# Patient Record
Sex: FEMALE | Race: WHITE
[De-identification: names, ages, dates, MRNs, and addresses within clinical notes are randomized per-mention and may not be internally consistent; named-entity substitution may affect disease eponyms.]

---

## 2020-07-15 NOTE — XRAY
Indication: Bilateral L4-S1 MBB.



Intraoperative fluoroscopy was provided for 10 seconds.  Single digital spot

image submitted for interpretation demonstrates posterior needle tips

projecting over the expected course of the left and right L4-S1 nerve roots.

Correlate with intraoperative findings/report.

## 2020-10-14 NOTE — XRAY
Indication: Bilateral L4-S1 MBB.



Intraoperative fluoroscopy was provided for 14 seconds.  Single digital spot

image submitted for interpretation demonstrates posterior needle tips

projecting over the expected course of the left and right L4-S1 nerve roots.

Correlate with intraoperative findings/report.

## 2020-11-11 NOTE — XRAY
Indication: Left L4-S1 RFA.



Intraoperative fluoroscopy was provided for 36 seconds.  3 digital spot image

submitted for interpretation demonstrate posterior needle tips projecting over

the expected left L4-S1 nerve roots.  Correlate with intraoperative

findings/report.

## 2020-11-18 NOTE — XRAY
Indication: Right L4-S1 RFA



Intraoperative fluoroscopy was provided for 26 seconds.  3 digital spot images

submitted for interpretation demonstrates posterior needle tips projecting

over the expected right L3-S1 nerve roots.  Correlate with intraoperative

findings/report.

## 2023-01-26 ENCOUNTER — HOSPITAL ENCOUNTER (EMERGENCY)
Dept: HOSPITAL 33 - ED | Age: 43
Discharge: HOME | End: 2023-01-26
Payer: COMMERCIAL

## 2023-01-26 VITALS — OXYGEN SATURATION: 98 % | DIASTOLIC BLOOD PRESSURE: 89 MMHG | SYSTOLIC BLOOD PRESSURE: 117 MMHG

## 2023-01-26 VITALS — HEART RATE: 69 BPM

## 2023-01-26 DIAGNOSIS — E78.5: ICD-10-CM

## 2023-01-26 DIAGNOSIS — S93.402A: Primary | ICD-10-CM

## 2023-01-26 PROCEDURE — 73610 X-RAY EXAM OF ANKLE: CPT

## 2023-01-26 PROCEDURE — 84550 ASSAY OF BLOOD/URIC ACID: CPT

## 2023-01-26 PROCEDURE — 96372 THER/PROPH/DIAG INJ SC/IM: CPT

## 2023-01-26 PROCEDURE — 36415 COLL VENOUS BLD VENIPUNCTURE: CPT

## 2023-01-26 PROCEDURE — 99283 EMERGENCY DEPT VISIT LOW MDM: CPT

## 2023-01-26 NOTE — ERPHSYRPT
- History of Present Illness


Time Seen by Provider: 23 18:14


Source: patient


Exam Limitations: no limitations


Patient Subjective Stated Complaint: pt here for swelling and pain to left ankle

since 23 with no injury, was seen on 23. no  fracture


Triage Nursing Assessment: pt alert. walked in, resp easy, face mask in place, 

has swelling and tenderness to out aspect of left ankle


Physician History: 





42 years old female presented in the ER with chief complaint of left ankle pain 

since eighth of this month.  Patient reports she woke up with ankle swelling and

pain.  Was seen out patient with negative x-rays per her.  Hurts with 

ambulation.  Denies any fall or trauma.


Method of Injury: unknown


Occurred: days ago


Quality: sharpness


Severity of Pain-Max: moderate


Severity of Pain-Current: moderate


Lower Extremities Pain: ankle: left


Modifying Factors: Improves With: immobilization.  Worsens With: movement


Associated Symptoms: No snapping sensation, No popping sensation


Allergies/Adverse Reactions: 








amoxicillin Allergy (Verified 23 18:23)


   





Hx Tetanus, Diphtheria Vaccination/Date Given: No


Immunizations Up to Date: Yes





Travel Risk





- International Travel


Have you traveled outside of the country in past 3 weeks: No





- Coronavirus Screening


Are you exhibiting any of the following symptoms?: No





- Vaccine Status


Have you recieved a Covid-19 vaccination: Yes


: Pfizer





- Vaccination Dates


Date of 2cond Vaccination (if applicable): 





- Review of Systems


Constitutional: No Symptoms


Ears, Nose, & Throat: No Symptoms


Respiratory: No Symptoms


Cardiac: No Symptoms


Abdominal/Gastrointestinal: No Symptoms


Genitourinary Symptoms: No Symptoms


Musculoskeletal: Joint Pain, Joint Swelling


Skin: No Symptoms


Neurological: No Symptoms


Endocrine: No Symptoms


Hematologic/Lymphatic: No Symptoms





- Past Medical History


Pertinent Past Medical History: Yes


Cardiac History: High Cholesterol


Psycho-Social History: Bipolar, Depression





- Past Surgical History


Past Surgical History: Yes


Gastrointestinal: Cholecystectomy


Female Surgical History:  Section


Other Surgical History: finger





- Social History


Smoking Status: Never smoker


Exposure to second hand smoke: No


Drug Use: none


Patient Lives Alone: No





- Female History


Hx Last Menstrual Period: last week


Hx Pregnant Now: No





- Nursing Vital Signs


Nursing Vital Signs: 


                               Initial Vital Signs











Temperature  97.3 F   23 18:21


 


Pulse Rate  85   23 18:21


 


Respiratory Rate  16   23 18:21


 


Blood Pressure  143/101   23 18:21


 


O2 Sat by Pulse Oximetry  100   23 18:21








                                   Pain Scale











Pain Intensity                 8

















- Physical Exam


General Appearance: no apparent distress, alert


Eyes, Ears, Nose, Throat Exam: normal ENT inspection


Neck Exam: normal inspection, supple, full range of motion


Cardiovascular/Respiratory Exam: normal breath sounds, regular rate/rhythm


Back Exam: normal range of motion


Ankle Exam: right ankle: non-tender, normal inspection, normal range of motion, 

left ankle: bone tenderness (lateral malleolus ), pain, soft tissue tenderness, 

swelling


Foot Exam: bilateral foot: non-tender, normal inspection, normal range of 

motion, no evidence of injury


Neuro/Tendon Exam: normal sensation, normal motor functions, normal tendon 

functions


Mental Status Exam: alert, oriented x 3, cooperative


Skin Exam: normal color


**SpO2 Interpretation**: normal


SpO2: 98


O2 Delivery: Room Air


Ordered Tests: 


                               Active Orders 24 hr











 Category Date Time Status


 


 ANKLE (3 VIEWS) Stat Exams  23 18:30 Taken


 


 Uric Acid Stat Lab  23 18:55 Completed








Medication Summary














Discontinued Medications














Generic Name Dose Route Start Last Admin





  Trade Name Babatundeq  PRN Reason Stop Dose Admin


 


Ketorolac Tromethamine  30 mg  23 18:31  23 18:37





  Ketorolac Tromethamine 30 Mg/Ml Inj  IM  23 18:32  30 mg





  STAT ONE   Administration


 


Ketorolac Tromethamine  Confirm  23 18:36 





  Ketorolac Tromethamine 30 Mg/Ml Inj  Administered  23 18:37 





  Dose  





  30 mg  





  .ROUTE  





  .K-MED ONE  











Lab/Rad Data: 


                               Laboratory Results











  23 Range/Units





  18:55 


 


Uric Acid  3.2  (2.6-6.0)  mg/dL














- Progress


Progress: improved, pain not gone completely


Progress Note: 





23 19:39





42 years old female is evaluated in the ER with right ankle pain and swelling 

for almost 3 weeks without any known trauma.  She was seen outpatient with 

negative x-rays per her.  She has been taking naproxen with no significant 

relief and now pain is getting worse.  It is more in the lateral malleoli are 

area.  She is able to ambulate but pain gets worse with ambulation.  No pain in 

the foot.  Does have swelling and tenderness of lateral malleolus.  X-rays 

negative for any acute fracture dislocation reviewed by me, official report is 

pending.  Given Toradol for symptomatic relief.  I believe patient has sprain 

and recommended/given Aircast and weightbearing as tolerated with outpatient 

orthopedics follow-up.  Also obtained uric acid which is negative for gout and 

no increased temperature or redness of joint.


Counseled pt/family regarding: diagnosis, need for follow-up, rad results





- Departure


Departure Disposition: Home


Clinical Impression: 


 Left ankle sprain





Condition: Stable


Critical Care Time: No


Referrals: 


OSMEL VEGA NP [Primary Care Provider] - Follow up/PCP as directed (1-2 

days for re evaluation)


ORTHO - EMERY RAWLS NP [NON-STAFF PHY W/O PRIVILEGES] - Follow up/PCP as 

directed (1-2 days for re evaluation)


Instructions:  Ankle Sprain (DC)


Additional Instructions: 


Take Tylenol/ibuprofen as needed for pain.  Intermittent ice application.  

Follow-up with orthopedics for reevaluation in 1 to 2 days.  Avoid exertional 

activities.  Return to ER for any worsening.


Prescriptions: 


Ibuprofen 600 mg PO Q6HPRN PRN 10 Days #20 tablet


 PRN Reason: Pain

## 2023-01-27 NOTE — XRAY
Indication: Pain and swelling.  No known injury.



Comparison: None



3 view left ankle demonstrates mild lateral soft tissue swelling and tiny heel

spurs.  No other bony, articular, or soft tissue abnormalities.

## 2023-06-09 ENCOUNTER — HOSPITAL ENCOUNTER (OUTPATIENT)
Dept: HOSPITAL 33 - SDC | Age: 43
Discharge: HOME | End: 2023-06-09
Attending: STUDENT IN AN ORGANIZED HEALTH CARE EDUCATION/TRAINING PROGRAM
Payer: COMMERCIAL

## 2023-06-09 VITALS — HEART RATE: 72 BPM | DIASTOLIC BLOOD PRESSURE: 74 MMHG | SYSTOLIC BLOOD PRESSURE: 134 MMHG

## 2023-06-09 VITALS — OXYGEN SATURATION: 97 %

## 2023-06-09 DIAGNOSIS — M65.9: Primary | ICD-10-CM

## 2023-06-09 DIAGNOSIS — S93.402A: ICD-10-CM

## 2023-06-09 DIAGNOSIS — M25.572: ICD-10-CM

## 2023-06-09 LAB
ALBUMIN SERPL-MCNC: 4.1 G/DL (ref 3.5–5)
ALP SERPL-CCNC: 93 U/L (ref 38–126)
ALT SERPL-CCNC: 29 U/L (ref 0–35)
ANION GAP SERPL CALC-SCNC: 14.3 MEQ/L (ref 5–15)
APTT PPP: 29.3 SECONDS (ref 25.1–36.5)
AST SERPL QL: 29 U/L (ref 14–36)
BILIRUB BLD-MCNC: 0.6 MG/DL (ref 0.2–1.3)
BUN SERPL-MCNC: 6 MG/DL (ref 7–17)
CALCIUM SPEC-MCNC: 8.4 MG/DL (ref 8.4–10.2)
CHLORIDE SERPL-SCNC: 105 MMOL/L (ref 98–107)
CO2 SERPL-SCNC: 24 MMOL/L (ref 22–30)
CREAT SERPL-MCNC: 0.56 MG/DL (ref 0.52–1.04)
GFR SERPLBLD BASED ON 1.73 SQ M-ARVRAT: > 60 ML/MIN
GLUCOSE SERPL-MCNC: 110 MG/DL (ref 74–106)
HCG UR QL: NEGATIVE
HCT VFR BLD AUTO: 39.2 % (ref 35–47)
HGB BLD-MCNC: 13.1 G/DL (ref 12–16)
INR PPP: 0.92 (ref 0.8–3)
MCH RBC QN AUTO: 27.7 PG (ref 26–32)
MCHC RBC AUTO-ENTMCNC: 33.4 G/DL (ref 32–36)
PLATELET # BLD AUTO: 301 X10^3/UL (ref 150–450)
POTASSIUM SERPLBLD-SCNC: 3.6 MMOL/L (ref 3.5–5.1)
PROT SERPL-MCNC: 7.5 G/DL (ref 6.3–8.2)
PROTHROMBIN TIME: 10.1 SECONDS (ref 9.4–12.5)
RBC # BLD AUTO: 4.73 X10^6/UL (ref 4.1–5.4)
SODIUM SERPL-SCNC: 140 MMOL/L (ref 137–145)
WBC # BLD AUTO: 5.2 X10^3/UL (ref 4–10.5)

## 2023-06-09 PROCEDURE — 85027 COMPLETE CBC AUTOMATED: CPT

## 2023-06-09 PROCEDURE — 80053 COMPREHEN METABOLIC PANEL: CPT

## 2023-06-09 PROCEDURE — C1713 ANCHOR/SCREW BN/BN,TIS/BN: HCPCS

## 2023-06-09 PROCEDURE — 36415 COLL VENOUS BLD VENIPUNCTURE: CPT

## 2023-06-09 PROCEDURE — 99211 OFF/OP EST MAY X REQ PHY/QHP: CPT

## 2023-06-09 PROCEDURE — 29898 ANKLE ARTHROSCOPY/SURGERY: CPT

## 2023-06-09 PROCEDURE — 81025 URINE PREGNANCY TEST: CPT

## 2023-06-09 PROCEDURE — 73610 X-RAY EXAM OF ANKLE: CPT

## 2023-06-09 PROCEDURE — 27829 TREAT LOWER LEG JOINT: CPT

## 2023-06-09 PROCEDURE — 85730 THROMBOPLASTIN TIME PARTIAL: CPT

## 2023-06-09 PROCEDURE — 85610 PROTHROMBIN TIME: CPT

## 2023-06-09 PROCEDURE — 76000 FLUOROSCOPY <1 HR PHYS/QHP: CPT

## 2023-06-09 NOTE — XRAY
Indication: Left ankle arthroscopy with extensive synovectomy and syndesmotic

ORIF.



Intraoperative fluoroscopy provided for 2 minutes 7 seconds.  9 digital spot

images submitted for interpretation ultimately demonstrates 2 distal

tibia/fibula transverse radiolucencies with medial/lateral orthopedic buttons.

 Correlate with intraoperative findings/report.

## 2023-06-09 NOTE — OP
SURGERY DATE/TIME:  06/09/2023   1030



PREOPERATIVE DIAGNOSES:

1) Acute disruption of syndesmosis left ankle.  

2) Ankle synovitis. 

3) Left ankle pain.  



POSTOPERATIVE DIAGNOSES:     

1) Acute disruption of syndesmosis left ankle.  

2) Ankle synovitis. 

3) Left ankle pain.  



PROCEDURE:  Ankle arthroscopy with extensive synovectomy left ankle and syndesmotic open 
reduction internal fixation. 



SURGEON:    Shahriar Hurd DPM. 



ASSISTANT:  None.     



ANESTHESIA:  General plus a preoperative regional block. See anesthesia report for 
details. 



HEMOSTASIS:  Thigh tourniquet set to 300 mm of Mercury for total 35 total tourniquet 
minutes. 



ESTIMATED BLOOD LOSS:   Minimal.

 

INJECTABLES:  See anesthesia report for details.



INDICATION FOR SURGERY:  Dai is a very pleasant 43-year-old female who presented to my 
service four months after having had an injury to her left ankle. She saw an orthopedic 
nurse practitioner who diagnosed her with an ankle sprain. The patient was in a CAM boot 
for several weeks and did notice some improvement with use of the CAM boot. However 
shortly thereafter discontinuing the CAM boot the pain came back. The patient presented to 
my service approximately one month ago and did have an injection from a diagnostic 
standpoint. However, she did have some relief, returned four weeks later with continued 
pain to the degree that it was. At this time clinical exam was obtained. Weightbearing 
views were obtained and this demonstrated some widening of the syndesmosis. In the clinic 
a medial collateral tibial fibular squeeze test was performed as well as external rotation 
test both of which had similar pain that the patient had. There was some pain at the 
subtalar joint and positive Mary's test. At this time demonstrating acute syndesmotic 
injury. The patient was demonstrated the issue and understands the pathology. As a result 
she would like to proceed with surgery at this time. No guarantees were provided as to the 
outcome of surgery. All risks, complications and benefits of surgical intervention were 
discussed at length including but not limited to infection, hematoma, seroma, possibility 
of delayed wound healing, nonwound healing, possibility of failure of surgical 
intervention and need for surgical intervention at a later date. No guarantees were 
provided as to the outcome of surgery. Plenty of time was allowed for her to ask questions 
which were answered to her apparent satisfaction. It is with that we decided to proceed. 



DESCRIPTION OF PROCEDURE AND FINDINGS:  The patient is brought into the OR and placed on 
the OR table in the supine position. At this time, general anesthesia was administered 
until the patient was sedated. At this time a well-padded thigh tourniquet was applied to 
the patient's left thigh and the tourniquet was set to 300 mm of Mercury. The left lower 
extremity was prepped and draped in the typical sterile fashion and lowered onto the 
surgical field. At this time attention was directed to the left ankle where landmarks were 
identified. The distal tip of the lateral malleolus and medial malleolus as well as the 
palpable dell of the ankle joint were identified. Along these lines the anterior medial 
and anterior lateral portal sites were established with a skin marker. An insufflation 
needle was utilized with lactated Ringer's approximately 20 cc were utilized to insufflate 
the ankle joint with the characteristic dorsiflexion of the foot during this portion of 
the procedure. An 11 blade was utilized to make a skin incision through the skin. Blunt 
dissection was carried down utilizing mini-hemostat until the insufflation fluid 
extravasated. Following this a blunt trocar with obturator introduced into the 
anteromedial portal site, this obturator was removed and a 30 degree camera was introduced 
into the anteromedial aspect of the joint. The joint was inspected. Lights were turned off 
and under the interior light of the scope, an anterolateral portal site was established in 
the same fashion as the anteromedial site. At this time the shaver was introduced. 
Extensive synovectomy took place. While the shaver was in place and looking at the 
tibiofibular articulation, the syndesmosis was stressed. Significant amount of scar tissue 
was seen in this site and deemed to be incompetent as well as with some subacute scar 
tissue in this area this was cleaned out aggressively. There was also a low lying anterior 
inferior talofibular ligament which was then resected at that time. The remaining joint 
was cleansed of any remaining crabmeat synovitis or constricture. Following this, the 
camera and the shaver were removed from the site.  The tourniquet was then inflated after 
[place]arching the leg and attention was directed to the lateral aspect of the left ankle 
where the syndesmosis was once again stressed. Fibula was pressed down and external 
rotation was performed demonstrating laxity confirming our syndesmotic disruption. A 15 
blade was utilized to make an incision at the posterior lateral aspect of the fibula this 
was held in place utilizing two K-wires perpendicular to the tibial plafond. Following 
's specifications, two Ganga ZipTight were introduced from posterior lateral 
to anterior medial orientation with some convergence thrown on the second more proximal 
one to hold rotational stability this was performed under maximal tension. Once again 
stress views were taken demonstrating left laxity to the syndesmosis. Following this 
copious amounts of sterile saline were utilized to flush the surgical site. 4-0 Monocryl 
was utilized in a subcutaneous closure-type fashion in a simple interrupted buried-type 
fashion and then 3-0 Nylon was utilized in horizontal mattress-type fashion to coapt the 
subcutaneous skin edges for both the portal site and the small 2 cm lateral leg wound. The 
patient was provided a dressing consisting of Betadine, Adaptic, 4x4, Kerlix and ACE to 
the left lower extremity and provided a CAM boot. The patient was then reversed from 
anesthesia and returned to the postoperative anesthesia care unit with vital signs stable 
and vascular status intact. The patient handled the anesthesia as well as the procedure 
without significant complication. Postoperative orders as indicated in the patient's 
discharge chart.

## 2023-06-09 NOTE — XRAY
2 minutes 7 seconds of fluoroscopy was used in surgery for a left ankle

arthroscopy with extensive synovectomy and syndesmotic ORIF.